# Patient Record
Sex: FEMALE | Race: WHITE
[De-identification: names, ages, dates, MRNs, and addresses within clinical notes are randomized per-mention and may not be internally consistent; named-entity substitution may affect disease eponyms.]

---

## 2017-02-14 ENCOUNTER — HOSPITAL ENCOUNTER (EMERGENCY)
Dept: HOSPITAL 25 - UCEAST | Age: 32
Discharge: HOME | End: 2017-02-14
Payer: COMMERCIAL

## 2017-02-14 VITALS — DIASTOLIC BLOOD PRESSURE: 79 MMHG | SYSTOLIC BLOOD PRESSURE: 121 MMHG
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
## 2017-02-14 DIAGNOSIS — Z88.1: ICD-10-CM

## 2017-02-14 DIAGNOSIS — S39.012A: ICD-10-CM

## 2017-02-14 DIAGNOSIS — F17.210: ICD-10-CM

## 2017-02-14 DIAGNOSIS — S16.1XXA: Primary | ICD-10-CM

## 2017-02-14 PROCEDURE — 72020 X-RAY EXAM OF SPINE 1 VIEW: CPT

## 2017-02-14 PROCEDURE — G0463 HOSPITAL OUTPT CLINIC VISIT: HCPCS

## 2017-02-14 PROCEDURE — 99213 OFFICE O/P EST LOW 20 MIN: CPT

## 2017-02-14 PROCEDURE — 72050 X-RAY EXAM NECK SPINE 4/5VWS: CPT

## 2017-02-14 NOTE — RAD
INDICATION: Neck injury     



COMPARISON: Cervical spine October 06, 2016

 

TECHNIQUE: A single lateral view of the cervical spine with a collar in place is submitted



FINDINGS: 



Bones: There are no acute bony findings. There are no significant osteoarthritic findings.



Craniocervical junction: The odontoid and atlantodental interval are normal.



Alignment: Normal



Disc spaces: The disc spaces are well-maintained



Soft tissues: The prevertebral soft tissues are normal.



IMPRESSION: THE SINGLE LATERAL VIEW DEMONSTRATES NO ABNORMALITIES. SUGGEST COMPLETION OF

THE SERIES.

## 2017-02-14 NOTE — RAD
INDICATION: Neck injury 



COMPARISON: Lateral cervical spine February 14, 2017; cervical spine October 06, 2016

 

TECHNIQUE: Routine five-view imaging was performed



FINDINGS: 



Bones: There are no acute bony findings. There are no significant osteoarthritic findings.



Craniocervical junction: The odontoid and atlantodental interval are normal.



Alignment: Normal



Disc spaces: The disc spaces are well-maintained



Soft tissues: The prevertebral soft tissues are normal.



IMPRESSION: NORMAL EXAMINATION.
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
## 2017-09-25 ENCOUNTER — HOSPITAL ENCOUNTER (EMERGENCY)
Dept: HOSPITAL 25 - ED | Age: 32
Discharge: HOME | End: 2017-09-25
Payer: COMMERCIAL

## 2017-09-25 ENCOUNTER — HOSPITAL ENCOUNTER (EMERGENCY)
Dept: HOSPITAL 25 - UCEAST | Age: 32
Discharge: HOME | End: 2017-09-25
Payer: COMMERCIAL

## 2017-09-25 VITALS — DIASTOLIC BLOOD PRESSURE: 64 MMHG | SYSTOLIC BLOOD PRESSURE: 117 MMHG

## 2017-09-25 DIAGNOSIS — M54.6: Primary | ICD-10-CM

## 2017-09-25 DIAGNOSIS — F17.210: ICD-10-CM

## 2017-09-25 DIAGNOSIS — G89.29: ICD-10-CM

## 2017-09-25 DIAGNOSIS — M54.5: Primary | ICD-10-CM

## 2017-09-25 LAB
ALBUMIN SERPL BCG-MCNC: 4.1 G/DL (ref 3.2–5.2)
ALP SERPL-CCNC: 55 U/L (ref 34–104)
ALT SERPL W P-5'-P-CCNC: 16 U/L (ref 7–52)
ANION GAP SERPL CALC-SCNC: 6 MMOL/L (ref 2–11)
AST SERPL-CCNC: 15 U/L (ref 13–39)
BUN SERPL-MCNC: 8 MG/DL (ref 6–24)
BUN/CREAT SERPL: 11.9 (ref 8–20)
CALCIUM SERPL-MCNC: 9.4 MG/DL (ref 8.6–10.3)
CHLORIDE SERPL-SCNC: 105 MMOL/L (ref 101–111)
GLOBULIN SER CALC-MCNC: 3.3 G/DL (ref 2–4)
GLUCOSE SERPL-MCNC: 84 MG/DL (ref 70–100)
HCO3 SERPL-SCNC: 26 MMOL/L (ref 22–32)
HCT VFR BLD AUTO: 41 % (ref 35–47)
HGB BLD-MCNC: 13.7 G/DL (ref 12–16)
MCH RBC QN AUTO: 31 PG (ref 27–31)
MCHC RBC AUTO-ENTMCNC: 34 G/DL (ref 31–36)
MCV RBC AUTO: 92 FL (ref 80–97)
POTASSIUM SERPL-SCNC: 3.6 MMOL/L (ref 3.5–5)
PROT SERPL-MCNC: 7.4 G/DL (ref 6.4–8.9)
RBC # BLD AUTO: 4.4 10^6/UL (ref 4–5.4)
SODIUM SERPL-SCNC: 137 MMOL/L (ref 133–145)
WBC # BLD AUTO: 10.3 10^3/UL (ref 3.5–10.8)

## 2017-09-25 PROCEDURE — G0463 HOSPITAL OUTPT CLINIC VISIT: HCPCS

## 2017-09-25 PROCEDURE — 85025 COMPLETE CBC W/AUTO DIFF WBC: CPT

## 2017-09-25 PROCEDURE — 80053 COMPREHEN METABOLIC PANEL: CPT

## 2017-09-25 PROCEDURE — 99212 OFFICE O/P EST SF 10 MIN: CPT

## 2017-09-25 PROCEDURE — 99284 EMERGENCY DEPT VISIT MOD MDM: CPT

## 2017-09-25 PROCEDURE — 36415 COLL VENOUS BLD VENIPUNCTURE: CPT

## 2017-09-25 PROCEDURE — 81003 URINALYSIS AUTO W/O SCOPE: CPT

## 2017-09-25 PROCEDURE — 84702 CHORIONIC GONADOTROPIN TEST: CPT

## 2017-09-25 PROCEDURE — 86140 C-REACTIVE PROTEIN: CPT

## 2017-09-25 PROCEDURE — 96374 THER/PROPH/DIAG INJ IV PUSH: CPT

## 2017-09-25 PROCEDURE — 74176 CT ABD & PELVIS W/O CONTRAST: CPT

## 2017-09-25 PROCEDURE — 96375 TX/PRO/DX INJ NEW DRUG ADDON: CPT

## 2017-09-25 NOTE — UC
Back Pain HPI





- HPI Summary


HPI Summary: 





Patient reports she suffers from chronic back pain secondary to scoliosis. She 

states she got up from a nap and had sudden severe thoracic back pain, to the 

left of midline. the pain is constant, worse with movement and only slightly 

improves when at rest. She denies any incontinence of bowel, bladder or saddle 

anesthesia. She states that this pain is similar to her pervious episodes of 

back pain. 





- History of Current Complaint


Chief Complaint: UCBackPain


Stated Complaint: BACK PAIN


Time Seen by Provider: 09/25/17 12:47


Hx Obtained From: Patient


Hx Last Menstrual Period: 9/20/17


Pregnant?: No


Onset/Duration: Sudden Onset


Timing: Constant


Severity Initially: Severe


Severity Currently: Severe


Back Pain: Is Discrete @


Character: Spasmodic, Stiffness


Aggravating Factor(s): Movement, Lifting, Bending


Associated Signs And Symptoms: Positive: Negative


Related History: Similar Episode Dx As - back pain





- Risk Factors


AAA Risk Factors: Negative


TAD Risk Factors: Negative


Cauda Equina Risk Factors: Negative


Epidural Abscess Risk Factors: Negative





- Allergies/Home Medications


Allergies/Adverse Reactions: 


 Allergies











Allergy/AdvReac Type Severity Reaction Status Date / Time


 


Azithromycin [From Zithromax] Allergy  Hives Verified 09/25/17 12:03


 


iodine in shellfish Allergy  Swelling Uncoded 09/25/17 12:03





   Of  





   Face,Lips,&  





   Throat  














PMH/Surg Hx/FS Hx/Imm Hx


Previously Healthy: No - chronic back pain





- Surgical History


Surgical History: Yes


Surgery Procedure, Year, and Place: C section 2008;.  tubal ligation 2011.  LEEP





- Family History


Known Family History: Positive: None, Hypertension


Family History: R & n/C





- Social History


Alcohol Use: Occasionally


Substance Use Type: None


Smoking Status (MU): Light Every Day Tobacco Smoker


Type: Cigarettes


Amount Used/How Often: 1/2 PPD


Length of Time of Smoking/Using Tobacco: 14 year


Have You Smoked in the Last Year: Yes





- Immunization History


Most Recent Influenza Vaccination: not recently


Most Recent Tetanus Shot: up to date





Review of Systems


Musculoskeletal: Decreased ROM, Myalgia


All Other Systems Reviewed And Are Negative: Yes





Physical Exam


Triage Information Reviewed: Yes


Appearance: Pain Distress


Vital Signs: 


 Initial Vital Signs











Temp  98 F   09/25/17 12:03


 


Pulse  89   09/25/17 12:03


 


Resp  17   09/25/17 12:03


 


Pulse Ox  100   09/25/17 12:03











Vital Signs Reviewed: Yes


Eye Exam: Normal


ENT Exam: Normal


Neck exam: Normal


Respiratory Exam: Normal


Cardiovascular Exam: Normal


Abdominal Exam: Normal


Musculoskeletal: Positive: No Edema, Other: - back inspected; vertebra are in 

good aligment without step-offs or deformities. no areas of eccymosis, erythem, 

or edema. vertrbra nontender midline. palpable pain at thoracic left side of 

musculature. lower extremity strength testing equal 3/5. sensory exam without 

deficits.





Back Pain Course/Dx





- Course


Course Of Treatment: Patient received tylenol, advil and 6 norco. recommend she 

follow up with pcp.





- Differential Dx/Diagnosis


Differential Diagnosis/HQI/PQRI: Strain, Sprain, Other - chronic back pain


Provider Diagnoses: chronic back pain





Discharge





- Discharge Plan


Condition: Stable


Disposition: HOME


Prescriptions: 


Acetaminophen TAB* [Tylenol TAB*] 650 mg PO Q4H PRN #20 tab


 PRN Reason: back pain


HYDROcodone/ACETAMIN 5-325 MG* [Norco 5-325 TAB*] 1 tab PO Q8H PRN #6 tab MDD 3


 PRN Reason: back pain


Ibuprofen TAB* [Motrin TAB* 600 MG] 600 mg PO Q6H PRN #20 tab


 PRN Reason: back pain


Referrals: 


James Khan MD [Primary Care Provider] -

## 2017-09-25 NOTE — RAD
INDICATION:  Left flank and back pain.



COMPARISON:  There are no prior studies available for comparison.



TECHNIQUE: A CT scan of the abdomen and pelvis was performed without intravenous or oral

contrast. Contiguous axial sections were obtained from the lung bases through the

symphysis pubis. Images were reconstructed in the coronal and sagittal planes.



FINDINGS:  The lung bases are clear.  No pleural effusion is present.



The liver and spleen are within normal limits in size without significant focal

abnormality on this noncontrast study. No calcified gallstones are seen. The pancreas

appears to be within normal limits in size.



The adrenal glands and kidneys are normal in size. No renal calculi or hydronephrosis is

seen. No ureteral or bladder calculi are seen.



The aorta is normal in caliber without significant calcific plaque.



Evaluation of the retroperitoneum is limited due to paucity of abdominal fat and lack of

contrast although no grossly enlarged retroperitoneal lymph nodes are seen.



The stomach, small and large bowel appear nondistended.  The appendix is within normal

limits.  There is no evidence for diverticulitis or colitis.



The uterus is retroverted and normal in size. No free intraperitoneal air or fluid is

seen.



No significant focal osseous abnormality or fracture is seen.



The intervertebral disc spaces appear maintained. There is no gross evidence for spinal

canal narrowing.



IMPRESSION:  NO EVIDENCE FOR ACUTE FINDING OR CAUSE FOR THE PATIENT'S ABDOMINAL PAIN IS

SEEN.

## 2017-09-25 NOTE — ED
Back Pain





- HPI Summary


HPI Summary: 





32 female presents to ED with complaints of left sided back pain that began 

yesterday 9/24/17 and has worsened since. Patient does have chronic back pain 

and admits to this feeling similar. Patient states the pain has gotten to the 

point she is unable to move without excruciating pain. Was seen at PCP and 

[facility] care who stated she should come here for further imaging. Given 

medication at  however has not taken anything. States she has not tried any 

medication for the pain. Urinated and had bowel movement this morning and were 

normal. Has not gone since. Denies any known burning or pain with urination. 

Nausea is associated with the pain when it is at its highest. Has not vomited. 

No other complaints. States pain has began to radiate down into left flank over 

the past couple of hours and in to left glute. She denies numbness tingling. 

Movement, palpation and changing positions makes the pain worse. Nothing makes 

pain better. Denies saddle anesthesia, weakness of LE and bladder/bowel 

incontinence. Moving lower extremities against resistance does cause her pain. 

No other complaints at this time. No other PMHx. No known trauma or injury. 





- History of Current Complaint


Chief Complaint: EDFlankJose


Stated Complaint: BACK PAIN-SENT FROM [place]


Time Seen by Provider: 09/25/17 17:53


Hx Obtained From: Patient


Hx Last Menstrual Period: 9/20/17


Onset/Duration: Sudden Onset, Lasting Days, Still Present, Worse Since


Onset/Duration: Started Days Ago - yesterday, Still Present, Worse Since


Timing: Constant


Back Pain Location: Is Discrete @ - left mid back


Severity Initially: Moderate


Severity Currently: Severe


Pain Intensity: 10


Pain Scale Used: 0-10 Numeric


Character: Sharp, Aching, Throbbing


Aggravating Symptom(s): Movement


Alleviating Symptom(s): Rest


Associated Signs And Symptoms: Positive: Negative, Flank Pain.  Negative: 

Swelling, Redness, Bruising, Weakness, Numbness, Abdominal Pain, Bladder 

Incontinence, Bowel Incontinence, Pain with Weight Bearing





- Risk Factors


AAA Risk Factors: Negative


TAD Risk Factors: Negative


Cauda Equina Risk Factors: Negative


Epidural Abscess Risk Factors: Negative





- Allergies/Home Medications


Allergies/Adverse Reactions: 


 Allergies











Allergy/AdvReac Type Severity Reaction Status Date / Time


 


Azithromycin [From Zithromax] Allergy  Hives Verified 09/25/17 12:03


 


Ketorolac Tromethamine Allergy  See Comment Verified 09/25/17 16:41





[From Toradol]     


 


iodine in shellfish Allergy  Swelling Uncoded 09/25/17 12:03





   Of  





   Face,Lips,&  





   Throat  














PMH/Surg Hx/FS Hx/Imm Hx


Endocrine/Hematology History: 


   Denies: Hx Diabetes, Hx Thyroid Disease


Cardiovascular History: 


   Denies: Hx Hypertension


Respiratory History: Reports: Hx Asthma - childhood asthma


   Denies: Hx Chronic Obstructive Pulmonary Disease (COPD)


GI History: 


   Denies: Hx Ulcer





- Surgical History


Surgery Procedure, Year, and Place: C section 2008;.  tubal ligation 2011.  LEEP





- Immunization History


Immunizations Up to Date: Yes


Infectious Disease History: No


Infectious Disease History: 


   Denies: Hx Clostridium Difficile, Hx Hepatitis, Hx Human Immunodeficiency 

Virus (HIV), Hx of Known/Suspected MRSA, Hx Shingles, Hx Tuberculosis, Hx Known/

Suspected VRE, Hx Known/Suspected VRSA, History Other Infectious Disease, 

Traveled Outside the [place] in Last 30 Days





- Family History


Known Family History: Positive: None, Hypertension


Family History: R & n/C





- Social History


Alcohol Use: Occasionally


Hx Substance Use: No


Substance Use Type: Reports: None


Hx Tobacco Use: Yes


Smoking Status (MU): Light Every Day Tobacco Smoker


Type: Cigarettes


Amount Used/How Often: 1/2 PPD


Length of Time of Smoking/Using Tobacco: 14 year


Have You Smoked in the Last Year: Yes





Review of Systems


Constitutional: Negative


Cardiovascular: Negative


Respiratory: Negative


Gastrointestinal: Negative


Positive: Arthralgia, Myalgia, Decreased ROM - back 


Skin: Negative


Neurological: Negative


All Other Systems Reviewed And Are Negative: Yes





Physical Exam


Triage Information Reviewed: Yes


Vital Signs On Initial Exam: 


 Initial Vitals











Temp Pulse Resp BP Pulse Ox


 


 97.3 F   67   16   117/64   99 


 


 09/25/17 16:33  09/25/17 16:33  09/25/17 16:33  09/25/17 16:33  09/25/17 16:33











Vital Signs Reviewed: Yes


Appearance: Positive: Well-Appearing, Pain Distress - moderate to severe


Skin: Positive: Warm, Skin Color Reflects Adequate Perfusion, Dry.  Negative: 

Cold, Numb, Tender, Weeping Skin/Lesions, Mass @, Erythema @


Head/Face: Positive: Normal Head/Face Inspection


Eyes: Positive: Conjunctiva Clear


ENT: Positive: Hearing grossly normal


Neck: Positive: Supple, Nontender


Respiratory/Lung Sounds: Positive: Clear to Auscultation, Breath Sounds 

Present.  Negative: Rales, Rhonchi, Wheezes


Cardiovascular: Positive: Normal, RRR, Pulses are Symmetrical in both Upper and 

Lower Extremities - 2+ pedal bl.  Negative: Murmur, Rub


Abdomen Description: Positive: Nontender, No Organomegaly, Soft, CVA Tenderness 

(R), CVA Tenderness (L).  Negative: Bruit, Distended, Guarding, Pulsatile Mass


Bowel Sounds: Positive: Present


Musculoskeletal: Positive: Limited @ - with movement of back and with moving LE

, Pain @ - T9-L3 area on palpation of.  Negative: Interruption @


Neurological: Positive: Normal, Sensory/Motor Intact, Alert, Oriented to Person 

Place, Time, CN Intact II-III, Reflexes Intact, NV Bundle Intact Distally, 

Normal Gait - walking slowly, holding back


Psychiatric: Positive: Affect/Mood Appropriate





- [place] Coma Scale


Best Eye Response: 4 - Spontaneous


Best Motor Response: 6 - Obeys Commands


Best Verbal Response: 5 - Oriented


Coma Scale Total: 15





Diagnostics





- Vital Signs


 Vital Signs











  Temp Pulse Resp BP Pulse Ox


 


 09/25/17 16:33  97.3 F  67  16  117/64  99














- Laboratory


Result Diagrams: 


 09/25/17 18:27





 09/25/17 18:27


Lab Statement: Any lab studies that have been ordered have been reviewed, and 

results considered in the medical decision making process.





- CT


  ** abd/pelvis including thoracic/lumbar spine


CT Interpretation: No Acute Changes


CT Interpretation Completed By: Radiologist





Re-Evaluation





- Re-Evaluation


  ** First Eval


Re-Evaluation Time: 20:23


Change: Improved - had some improvement however pain is still present, updated 

on lab and imaging results. aware of plan of action and is in agreement and 

understands.





  ** Second Eval


Re-Evaluation Time: 20:42


Change: Improved


Comment: feels better, ready to be d/c





Back Pain Course/Dx





- Course


Course Of Treatment: obtained labs and urinalysis. given pain medication. CT abd

/pelvis obtained including thoracic and lumbar spine. Unremarkable. No sign of 

renal calculi. No significant findings on labs, imaging or urine. No fever/

chills. Normal vitals. PE findings significant for MSK pain of mid left back. 

No other emergent etiology of concern at this time. Patient had relief with 

pain medication. Appears to be suffering a possible radiculopathy. Follow up 

neurosurg, further imaging. Given pain management. Aware of worsening signs and 

symptoms to watch out for. IStop  Reference #: [id number]





- Diagnoses


Differential Diagnosis/HQI/PQRI: Positive: Arthritis, Herniated Disc, Renal 

Colic, Strain, Sprain


Provider Diagnoses: 


 Back pain








Discharge





- Discharge Plan


Condition: Stable


Disposition: HOME


Prescriptions: 


Cyclobenzaprine TAB* [Flexeril 10 MG TAB*] 10 mg PO BEDTIME PRN #7 tab


 PRN Reason: Spasms


HYDROcodone/ACETAMIN 5-325 MG* [Norco 5-325 TAB*] 1 tab PO Q6H PRN #10 tab MDD 3


 PRN Reason: Pain


Patient Education Materials:  Back Pain (ED)


Referrals: 


Jhonatan Mayo MD [Medical Doctor] - 


James Khan MD [Primary Care Provider] - 


Additional Instructions: 


Take prescribed medications as directed to help with pain and inflammation, as 

needed. Take with food.


Take flexeril, muscle relaxer at bedtime.


Do not drive while taking these medications.


Apply warm compresses to soothe pain and loosen muscles. Ice at bedtime. Rest.


Drink plenty of fluids.


Follow up and make an appointment with back specialist and PCP for further 

evaluation and imaging.


If you develop worsening signs and symptoms as we discussed, (fever, abdominal 

pain, vomiting, numbness/tingling, bladder/bowel incontinence or weakness) 

please return.

## 2018-09-06 ENCOUNTER — HOSPITAL ENCOUNTER (EMERGENCY)
Dept: HOSPITAL 25 - ED | Age: 33
Discharge: HOME | End: 2018-09-06
Payer: COMMERCIAL

## 2018-09-06 VITALS — DIASTOLIC BLOOD PRESSURE: 74 MMHG | SYSTOLIC BLOOD PRESSURE: 113 MMHG

## 2018-09-06 DIAGNOSIS — E86.0: ICD-10-CM

## 2018-09-06 DIAGNOSIS — F17.210: ICD-10-CM

## 2018-09-06 DIAGNOSIS — Z88.5: ICD-10-CM

## 2018-09-06 DIAGNOSIS — Z88.1: ICD-10-CM

## 2018-09-06 DIAGNOSIS — E83.42: ICD-10-CM

## 2018-09-06 DIAGNOSIS — E87.6: ICD-10-CM

## 2018-09-06 DIAGNOSIS — A08.4: Primary | ICD-10-CM

## 2018-09-06 LAB
BASOPHILS # BLD AUTO: 0.1 10^3/UL (ref 0–0.2)
EOSINOPHIL # BLD AUTO: 0.1 10^3/UL (ref 0–0.6)
HCT VFR BLD AUTO: 39 % (ref 35–47)
HGB BLD-MCNC: 13.3 G/DL (ref 12–16)
LYMPHOCYTES # BLD AUTO: 2 10^3/UL (ref 1–4.8)
MCH RBC QN AUTO: 32 PG (ref 27–31)
MCHC RBC AUTO-ENTMCNC: 34 G/DL (ref 31–36)
MCV RBC AUTO: 93 FL (ref 80–97)
MONOCYTES # BLD AUTO: 0.4 10^3/UL (ref 0–0.8)
NEUTROPHILS # BLD AUTO: 3 10^3/UL (ref 1.5–7.7)
NRBC # BLD AUTO: 0 10^3/UL
NRBC BLD QL AUTO: 0.1
PLATELET # BLD AUTO: 173 10^3/UL (ref 150–450)
RBC # BLD AUTO: 4.21 10^6/UL (ref 4–5.4)
WBC # BLD AUTO: 5.5 10^3/UL (ref 3.5–10.8)

## 2018-09-06 PROCEDURE — 85025 COMPLETE CBC W/AUTO DIFF WBC: CPT

## 2018-09-06 PROCEDURE — 81003 URINALYSIS AUTO W/O SCOPE: CPT

## 2018-09-06 PROCEDURE — 96375 TX/PRO/DX INJ NEW DRUG ADDON: CPT

## 2018-09-06 PROCEDURE — 80053 COMPREHEN METABOLIC PANEL: CPT

## 2018-09-06 PROCEDURE — 84702 CHORIONIC GONADOTROPIN TEST: CPT

## 2018-09-06 PROCEDURE — 99283 EMERGENCY DEPT VISIT LOW MDM: CPT

## 2018-09-06 PROCEDURE — 83735 ASSAY OF MAGNESIUM: CPT

## 2018-09-06 PROCEDURE — 96374 THER/PROPH/DIAG INJ IV PUSH: CPT

## 2018-09-06 PROCEDURE — 36415 COLL VENOUS BLD VENIPUNCTURE: CPT

## 2018-09-06 NOTE — ED
Complex/Multi-Sys Presentation





- HPI Summary


HPI Summary: 


Patient is a 33-year-old female who presents emergency department for numerous 

complaints.  All of her symptoms started yesterday.  Patient complains of 

vomiting, diarrhea, bilateral flank pain, tingling to bilateral hands and 

muscle cramps.  She denies chest pain, shortness of breath, cough, urinary 

symptoms.  Symptoms are moderate in severity.  Denies significant past medical 

history.  No current modifying factors.  Denies sick contacts or recent travel.








- History Of Current Complaint


Chief Complaint: EDFlankPain


Time Seen by Provider: 09/06/18 11:29


Hx Obtained From: Patient





- Allergies/Home Medications


Allergies/Adverse Reactions: 


 Allergies











Allergy/AdvReac Type Severity Reaction Status Date / Time


 


MS Azithromycin Allergy  Hives Verified 10/10/17 15:52





[From Zithromax]     


 


MS Ketorolac Tromethamine Allergy  See Comment Verified 10/10/17 15:52





[From Toradol]     


 


iodine in shellfish Allergy  Swelling Uncoded 10/10/17 15:52





   Of  





   Face,Lips,&  





   Throat  














PMH/Surg Hx/FS Hx/Imm Hx


Previously Healthy: Yes


Endocrine/Hematology History: 


   Denies: Hx Diabetes, Hx Thyroid Disease


Cardiovascular History: 


   Denies: Hx Hypertension, Hx Pacemaker/ICD


Respiratory History: Reports: Hx Asthma - childhood asthma


   Denies: Hx Chronic Obstructive Pulmonary Disease (COPD)


GI History: 


   Denies: Hx Ulcer


Sensory History: 


   Denies: Hx Hearing Aid


Psychiatric History: 


   Denies: Hx Panic Disorder





- Surgical History


Surgery Procedure, Year, and Place: C section 2008;.  tubal ligation 2011.  LEEP





- Immunization History


Date of Tetanus Vaccine: "up to date"


Infectious Disease History: No


Infectious Disease History: 


   Denies: Hx Clostridium Difficile, Hx Hepatitis, Hx Human Immunodeficiency 

Virus (HIV), Hx of Known/Suspected MRSA, Hx Shingles, Hx Tuberculosis, Hx Known/

Suspected VRE, Hx Known/Suspected VRSA, History Other Infectious Disease, 

Traveled Outside the [place] in Last 30 Days





- Family History


Known Family History: Positive: None, Hypertension


Family History: R & n/C





- Social History


Occupation: Employed Full-time


Lives: With Family


Alcohol Use: Occasionally


Hx Substance Use: No


Substance Use Type: Reports: Marijuana


Hx Tobacco Use: Yes


Smoking Status (MU): Heavy Every Day Tobacco Smoker


Type: Cigarettes


Amount Used/How Often: 1/2 PPD


Length of Time of Smoking/Using Tobacco: 14 year


Have You Smoked in the Last Year: Yes





Review of Systems


Constitutional: Negative


Negative: Fever, Chills


Eyes: Negative


ENT: Negative


Cardiovascular: Negative


Negative: Chest Pain


Respiratory: Negative


Negative: Shortness Of Breath, Cough


Positive: Vomiting, Diarrhea, Nausea.  Negative: Abdominal Pain


Positive: flank pain.  Negative: dysuria, discharge, hematuria


Positive: Other - tingling to bilateral hands, diffuse muscle cramps.


Neurological: Negative


Positive: Paresthesia - tingling to hands..  Negative: Headache, Weakness, 

Numbness, Syncope, Slurred Speech


All Other Systems Reviewed And Are Negative: Yes





Physical Exam


Triage Information Reviewed: Yes


Vital Signs On Initial Exam: 


 Initial Vitals











Temp Pulse Resp BP Pulse Ox


 


 98.5 F   74   18   115/80   95 


 


 09/06/18 11:18  09/06/18 11:18  09/06/18 11:18  09/06/18 11:18  09/06/18 11:18











Vital Signs Reviewed: Yes


Appearance: Positive: Well-Appearing - Pt. Sitting up in bed, appears 

uncomfortable but nontoxic.


Skin: Positive: Warm, Dry


Head/Face: Positive: Normal Head/Face Inspection


Eyes: Positive: Normal, EOMI


Neck: Positive: Supple


Respiratory/Lung Sounds: Positive: Clear to Auscultation, Breath Sounds Present


Cardiovascular: Positive: Normal, RRR


Abdomen Description: Positive: Nontender, Soft, CVA Tenderness (R), CVA 

Tenderness (L)


Musculoskeletal: Positive: Normal, Strength/ROM Intact - 5/5 strength in 

bilateral UE and LEs


Neurological: Positive: Normal, CN Intact II-III


Psychiatric: Positive: Affect/Mood Appropriate





Diagnostics





- Vital Signs


 Vital Signs











  Temp Pulse Resp BP Pulse Ox


 


 09/06/18 14:04  98.1 F  62  16  113/74  98


 


 09/06/18 13:27   70  16  129/75  98


 


 09/06/18 12:11    20  


 


 09/06/18 11:18  98.5 F  74  18  115/80  95














- Laboratory


Lab Results: 


 Lab Results











  09/06/18 09/06/18 09/06/18 Range/Units





  12:20 12:20 13:25 


 


WBC  5.5    (3.5-10.8)  10^3/ul


 


RBC  4.21    (4.00-5.40)  10^6/ul


 


Hgb  13.3    (12.0-16.0)  g/dl


 


Hct  39    (35-47)  %


 


MCV  93    (80-97)  fL


 


MCH  32 H    (27-31)  pg


 


MCHC  34    (31-36)  g/dl


 


RDW  13    (10.5-15)  %


 


Plt Count  173    (150-450)  10^3/ul


 


MPV  9.7    (7.4-10.4)  um3


 


Neut % (Auto)  53.9    (38-83)  %


 


Lymph % (Auto)  36.3    (25-47)  %


 


Mono % (Auto)  7.0    (0-7)  %


 


Eos % (Auto)  1.7    (0-6)  %


 


Baso % (Auto)  1.1    (0-2)  %


 


Absolute Neuts (auto)  3.0    (1.5-7.7)  10^3/ul


 


Absolute Lymphs (auto)  2.0    (1.0-4.8)  10^3/ul


 


Absolute Monos (auto)  0.4    (0-0.8)  10^3/ul


 


Absolute Eos (auto)  0.1    (0-0.6)  10^3/ul


 


Absolute Basos (auto)  0.1    (0-0.2)  10^3/ul


 


Absolute Nucleated RBC  0    10^3/ul


 


Nucleated RBC %  0.1    


 


Sodium   141   (135-145)  mmol/L


 


Potassium   3.0 L   (3.5-5.0)  mmol/L


 


Chloride   110   (101-111)  mmol/L


 


Carbon Dioxide   25   (22-32)  mmol/L


 


Anion Gap   6   (2-11)  mmol/L


 


BUN   8   (6-24)  mg/dL


 


Creatinine   0.60   (0.51-0.95)  mg/dL


 


Est GFR ( Amer)   141.1   (>60)  


 


Est GFR (Non-Af Amer)   116.6   (>60)  


 


BUN/Creatinine Ratio   13.3   (8-20)  


 


Glucose   100   ()  mg/dL


 


Calcium   9.0   (8.6-10.3)  mg/dL


 


Magnesium   1.7 L   (1.9-2.7)  mg/dL


 


Total Bilirubin   0.30   (0.2-1.0)  mg/dL


 


AST   17   (13-39)  U/L


 


ALT   16   (7-52)  U/L


 


Alkaline Phosphatase   57   ()  U/L


 


Total Protein   6.5   (6.4-8.9)  g/dL


 


Albumin   3.9   (3.2-5.2)  g/dL


 


Globulin   2.6   (2-4)  g/dL


 


Albumin/Globulin Ratio   1.5   (1-3)  


 


Beta HCG, Quant   < 0.60   mIU/mL


 


Urine Color    Yellow  


 


Urine Appearance    Clear  


 


Urine pH    8.0  (5-9)  


 


Ur Specific Gravity    1.017  (1.010-1.030)  


 


Urine Protein    Negative  (Negative)  


 


Urine Ketones    Trace A  (Negative)  


 


Urine Blood    Negative  (Negative)  


 


Urine Nitrate    Negative  (Negative)  


 


Urine Bilirubin    Negative  (Negative)  


 


Urine Urobilinogen    Negative  (Negative)  


 


Ur Leukocyte Esterase    Negative  (Negative)  


 


Urine Glucose    Negative  (Negative)  











Result Diagrams: 


 09/06/18 12:20





 09/06/18 12:20


Lab Statement: Any lab studies that have been ordered have been reviewed, and 

results considered in the medical decision making process.





Complex Multi-Symp Course/Dx


Course Of Treatment: Patient presenting to the ER with the above complaints.  

She is afebrile with stable vital signs.  Patient was given IV fluids, Zofran 

and it does morphine.  Basic blood work was obtained.  She has benign abdominal 

exam.  CBC is unremarkable.  CMP shows mildly low potassium and magnesium of 

3.0 and 1.7.  These were repleted orally and patient tolerated well.  On 

reexamination patient is sitting up and states she is feeling much better after 

fluids and medication.  Suspect viral gastroenteritis zoning and mild 

dehydration and electrolyte abnormality.  Urinalysis shows small ketones but is 

negative for infection. Rx for Zofran sent patient's pharmacy.  Advised patient 

to increase fluids.  Did an over-the-counter a daily vitamin.  Close follow-up 

with PCP for recheck blood work.  Patient will return to the ER symptoms change 

or worsen.  Patient understands and agrees with plan.





- Diagnoses


Provider Diagnoses: 


 Dehydration, Viral gastroenteritis, Hypomagnesemia, Hypokalemia








Discharge





- Sign-Out/Discharge


Documenting (check all that apply): Patient Departure





- Discharge Plan


Condition: Good


Disposition: HOME


Prescriptions: 


Ondansetron TAB* [Zofran 4 MG Tab*] 4 mg PO Q6H PRN #12 tab


 PRN Reason: Nausea


Patient Education Materials:  Dehydration (ED), Hypokalemia (ED), 

Gastroenteritis (ED), Hypomagnesemia (ED)


Forms:  *Work Release


Referrals: 


James Khan MD [Primary Care Provider] - 


Additional Instructions: 


Schedule a follow up appointment with PCP for repeat blood work


Increase fluids


Zofran as directed


Take an over the counter daily vitamin


Tylenol or Motrin for pain as directed


Return to ER if symptoms change or worsen





- Billing Disposition and Condition


Condition: GOOD


Disposition: Home